# Patient Record
Sex: MALE
[De-identification: names, ages, dates, MRNs, and addresses within clinical notes are randomized per-mention and may not be internally consistent; named-entity substitution may affect disease eponyms.]

---

## 2018-06-27 ENCOUNTER — HOSPITAL ENCOUNTER (EMERGENCY)
Dept: HOSPITAL 39 - ER | Age: 28
Discharge: HOME | End: 2018-06-27
Payer: SELF-PAY

## 2018-06-27 VITALS — OXYGEN SATURATION: 98 % | TEMPERATURE: 96.9 F

## 2018-06-27 VITALS — SYSTOLIC BLOOD PRESSURE: 137 MMHG | DIASTOLIC BLOOD PRESSURE: 91 MMHG

## 2018-06-27 DIAGNOSIS — S62.634B: ICD-10-CM

## 2018-06-27 DIAGNOSIS — S61.214A: ICD-10-CM
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
## 2018-06-27 DIAGNOSIS — S67.194A: Primary | ICD-10-CM

## 2018-06-27 PROCEDURE — 82948 REAGENT STRIP/BLOOD GLUCOSE: CPT

## 2018-06-27 PROCEDURE — 36416 COLLJ CAPILLARY BLOOD SPEC: CPT

## 2018-06-27 PROCEDURE — 73140 X-RAY EXAM OF FINGER(S): CPT

## 2018-06-27 NOTE — ED.PDOC
History of Present Illness





- General


Chief Complaint: Upper Extremity Injury


Stated Complaint: R 4th finger injury


Time Seen by Provider: 06/27/18 07:52


Source: patient, RN notes reviewed, Vital Signs reviewed, other - co worker


Exam Limitations: no limitations





- History of Present Illness


Pain - Upper Extremity: moderate: Hand, right


Method of Injury: direct blow


Improving Factors: immobilization


Worsening Factors: movement


Allergies/Adverse Reactions: 


Allergies





NO KNOWN ALLERGY Allergy (Verified 06/27/18 07:52)


 








Home Medications: 


Ambulatory Orders





Cephalexin Monohydrate [Keflex] 1,000 mg PO BID 10 Days #40 cap 06/27/18 


NK [NK]  06/27/18 


Tramadol HCl [Ultram] 50 mg PO Q6H PRN #20 tab 06/27/18 











Review of Systems





- Review of Systems


Constitutional: States: no symptoms reported


EENTM: States: no symptoms reported


Respiratory: States: no symptoms reported


Cardiology: States: no symptoms reported


Gastrointestinal/Abdominal: States: no symptoms reported


Genitourinary: States: no symptoms reported


Musculoskeletal: States: other - finger pain


Skin: States: other - laceration, crush injury


Neurological: States: no symptoms reported





Family Medical History





- Family History


  ** Father


Living Status: Still Living


Hx Family Diabetes: Yes





Physical Exam





- Physical Exam


General Appearance: Alert, No apparent distress


Eyes, Ears, Nose, Throat Exam: PERRL/EOMI


Neck: non-tender, full range of motion


Cardiovascular/Respiratory: regular rate, rhythm


Abdominal Exam: non-tender


Back Exam: normal inspection, no CVA tenderness


Shoulder Exam: normal inspection, non-tender, no evidence of injury, normal ROM


Elbow/Forearm Exam: normal inspection, non-tender, no evidence of injury, 

normal ROM


Wrist Exam: normal inspection, non-tender, no evidence of injury, normal ROM


Hand Exam: limited ROM - crush injury of distal 4th digit with bleeding and 

skin avulsion, cap refill intact, tenderness to palpation of DIP





Progress





- Progress


Progress: 





06/27/18 08:25


Xray shows distal tuff fracture. will place on abx and soak digit for wound 

care. 


06/27/18 08:26


tdap utd


06/27/18 08:38


digital block performed on the right 4th digit for pain control using 

lidocaine. Performed by myself after verbal consent. Pt tolerated well, pain 

controlled


06/27/18 09:04


Finger fracture splinted by nursing staff under my direct supervision. aluminum 

finger splint for tuff fracture. normal n/v exam after application 





Procedures





- Laceration/Wound Repair


  ** Right Distal Finger


Wound's Depth, Shape: irregular, stellate


Wound Explored: clean


Irrigated w/ Saline (cc's): 400


Betadine Prep?: Yes


Anesthesia: 1% Lidocaine


Volume Anesthetic (cc's): 1


Wound Debrided: minimal


Wound Repaired With: sutures


Suture Size/Type: 5:0, nylon - 5 simple inturrupted and 1 horizontal matress 

suture


Layer Closure?: No


Sterile Dressing Applied?: Yes


Splint Applied?: Yes - finger splint applied for fracture not laceration





Departure





- Departure


Clinical Impression: 


 Crush injury





Laceration of finger, right, complicated


Qualifiers:


 Encounter type: initial encounter Qualified Code(s): S61.411A - Laceration 

without foreign body of right hand, initial encounter





Finger fracture, right


Qualifiers:


 Encounter type: initial encounter Finger: ring finger Fracture type: open 

Phalanx: distal Fracture alignment: displaced Qualified Code(s): S62.634B - 

Displaced fracture of distal phalanx of right ring finger, initial encounter 

for open fracture





Time of Disposition: 09:08


Disposition: Discharge to Home or Self Care


Condition: Excellent


Departure Forms:  ED Discharge - Pt. Copy, Patient Portal Self Enrollment


Instructions:  DI for Arm Pain, DI for Fracture, Laceration Repair


Diet: resume usual diet


Activity: other - no activity with right 4th digit


Referrals: 


Anders Tanner MD [Active Staff] - 1-5 Days (open distal finger fracture, s/p 

irrigation and repair)


Prescriptions: 


Cephalexin Monohydrate [Keflex] 1,000 mg PO BID 10 Days #40 cap


Tramadol HCl [Ultram] 50 mg PO Q6H PRN #20 tab


 PRN Reason: Pain


Home Medications: 


Ambulatory Orders





Cephalexin Monohydrate [Keflex] 1,000 mg PO BID 10 Days #40 cap 06/27/18 


NK [NK]  06/27/18 


Tramadol HCl [Ultram] 50 mg PO Q6H PRN #20 tab 06/27/18 








Additional Instructions: 


Por Favor, limpia dos veces al britni con jabon. Secalo y moises neosporina. Replace 

el hueso. Regresa si hay problema. Sigue con el Medico Hay para reevaluacion.

## 2018-06-27 NOTE — RAD
3 views right fourth finger.



Indication: trauma



Comparison: None.



Impression:



Mildly comminuted fracture of the radial margin of the tuft of

the fourth distal phalanx noted. 3 mm volar displacement of the

distal fracture fragment noted. Associated soft tissue swelling

present.



Electronically signed by:  Tho Barrera MD  6/27/2018 8:42 AM CDT

Workstation: [id number]